# Patient Record
Sex: MALE | Race: OTHER | HISPANIC OR LATINO | Employment: FULL TIME | URBAN - METROPOLITAN AREA
[De-identification: names, ages, dates, MRNs, and addresses within clinical notes are randomized per-mention and may not be internally consistent; named-entity substitution may affect disease eponyms.]

---

## 2023-03-23 ENCOUNTER — OFFICE VISIT (OUTPATIENT)
Dept: BARIATRICS | Facility: CLINIC | Age: 28
End: 2023-03-23

## 2023-03-23 VITALS
DIASTOLIC BLOOD PRESSURE: 70 MMHG | WEIGHT: 280.2 LBS | BODY MASS INDEX: 39.23 KG/M2 | HEIGHT: 71 IN | SYSTOLIC BLOOD PRESSURE: 118 MMHG | HEART RATE: 81 BPM

## 2023-03-23 DIAGNOSIS — R63.5 ABNORMAL WEIGHT GAIN: ICD-10-CM

## 2023-03-23 DIAGNOSIS — R73.03 PREDIABETES: ICD-10-CM

## 2023-03-23 DIAGNOSIS — Z91.89 AT RISK FOR SLEEP APNEA: ICD-10-CM

## 2023-03-23 DIAGNOSIS — E66.9 OBESITY, CLASS II, BMI 35-39.9: Primary | ICD-10-CM

## 2023-03-23 RX ORDER — FAMOTIDINE 40 MG/1
TABLET, FILM COATED ORAL
COMMUNITY
Start: 2023-01-10

## 2023-03-23 RX ORDER — TERBINAFINE HYDROCHLORIDE 250 MG/1
250 TABLET ORAL DAILY
COMMUNITY
Start: 2023-03-16

## 2023-03-23 NOTE — ASSESSMENT & PLAN NOTE
- Stop bang 5/8  - Risks of untreated sleep apnea reviewed, including increased risk for myocardial infarction and stroke, increased difficulty with weight loss, and risk of sudden cardiac death due to arrhythmia   - Sleep medicine referral placed

## 2023-03-23 NOTE — ASSESSMENT & PLAN NOTE
- Discussed options of HealthyCORE-Intensive Lifestyle Intervention Program, Very Low Calorie Diet-VLCD and Conservative Program and the role of weight loss medications  - Explained the importance of making lifestyle changes first before starting anti-obesity medications  - Patient is interested in pursuing Conservative Program  - Initial weight loss goal of 5-10% weight loss for improved health  - Weight loss can improve patient's co-morbid conditions and/or prevent weight-related complications  - Stop Rogesusan Thompsonen 5/8  - Labs reviewed: CMP 1/18/2023, CMP, lipid panel, CBC, and A1C 12/13/2022  ALT elevated - being monitored by PCP  Chol, trig, and LDL elevated, HDL low, A1C in prediabetes range at 5 7, which will all likely improve with weight loss and cardiovascular exercise  Otherwise, labs within acceptable range  - check TSH  Goals:  Do not skip meals  Food log (ie ) www myfitnesspal com,sparkpeople  com,loseit com,calorieking  com,etc  baritastic (use skinnytaste  com, dietdoctor  com or smartphone ponce [employer] for recipes)  No sugary beverages  At least 64oz of water daily  Increase physical activity by 10 minutes daily  Gradually increase physical activity to a goal of 5 days per week for 30 minutes of MODERATE intensity PLUS 2 days per week of FULL BODY resistance training (use smartphone apps [employer], Home Workout, etc )  Start food logging, weighing and measuring food, including creamer and sugar  2200 calories per day  Sample menu given  Increase water to at least 64 oz daily  Start exercise - walking or gym 2 days per week for 10 minutes and gradually increase to goal of 5 days per week for 30 minutes cardiovascular exercise and 2 days resistance training

## 2023-03-23 NOTE — PROGRESS NOTES
Assessment/Plan:    Obesity, Class II, BMI 35-39 9  - Discussed options of HealthyCORE-Intensive Lifestyle Intervention Program, Very Low Calorie Diet-VLCD and Conservative Program and the role of weight loss medications  - Explained the importance of making lifestyle changes first before starting anti-obesity medications  - Patient is interested in pursuing Conservative Program  - Initial weight loss goal of 5-10% weight loss for improved health  - Weight loss can improve patient's co-morbid conditions and/or prevent weight-related complications  - Stop Rosa Fritz 5/8  - Labs reviewed: CMP 1/18/2023, CMP, lipid panel, CBC, and A1C 12/13/2022  ALT elevated - being monitored by PCP  Chol, trig, and LDL elevated, HDL low, A1C in prediabetes range at 5 7, which will all likely improve with weight loss and cardiovascular exercise  Otherwise, labs within acceptable range  - check TSH  Goals:  Do not skip meals  Food log (ie ) www myfitnesspal com,sparkpeople  com,loseit com,calorieking  com,etc  baritastic (use skinnytaste  com, dietdoctor  com or smartphone ponce [employer] for recipes)  No sugary beverages  At least 64oz of water daily  Increase physical activity by 10 minutes daily  Gradually increase physical activity to a goal of 5 days per week for 30 minutes of MODERATE intensity PLUS 2 days per week of FULL BODY resistance training (use smartphone apps [employer], Home Workout, etc )  Start food logging, weighing and measuring food, including creamer and sugar  2200 calories per day  Sample menu given  Increase water to at least 64 oz daily  Start exercise - walking or gym 2 days per week for 10 minutes and gradually increase to goal of 5 days per week for 30 minutes cardiovascular exercise and 2 days resistance training  Prediabetes  - Will likely improve with weight loss  At risk for sleep apnea  - Stop bang 5/8     - Risks of untreated sleep apnea reviewed, including increased risk for myocardial infarction and stroke, increased difficulty with weight loss, and risk of sudden cardiac death due to arrhythmia   - Sleep medicine referral placed  Satnam Colon was seen today for consult  Diagnoses and all orders for this visit:    Obesity, Class II, BMI 35-39 9  -     TSH, 3rd generation with Free T4 reflex; Future    Prediabetes  -     TSH, 3rd generation with Free T4 reflex; Future    At risk for sleep apnea  -     Ambulatory referral to Sleep Medicine; Future    Abnormal weight gain  -     TSH, 3rd generation with Free T4 reflex; Future          Follow up in approximately 2-3 months with Non-Surgical Physician/Advanced Practitioner  Subjective:   Chief Complaint   Patient presents with   • Consult     MWM Consult       Patient ID: Omar David  is a 29 y o  male with excess weight/obesity here to pursue weight management  Previous notes and records have been reviewed  History reviewed  No pertinent past medical history  History reviewed  No pertinent surgical history  HPI:  Wt Readings from Last 20 Encounters:   23 127 kg (280 lb 3 2 oz)     Obesity/Excess Weight:  Severity: Moderate  Onset:  5 years    Modifiers: Diet and Exercise, Physician Supervised Weight Loss Program and Prescription Weight Loss Medications  Contributing factors: Poor Food Choices and Insufficient time to make appropriate lifestyle changes  Associated symptoms: increased joint pain and increased shortness of breath    Took phentermine and topamax in the past, lost weight, but gained back when stopped  Had some blurred vision and didn't feel well at first, but side effects improved         Hydration: 3-4 bottles of water, 2 cups coffee with cream and sugar, soda once a week  Alcohol: rare  Smoking: vapes almost daily  Exercise: nothing currently   Occupation: [employer]  Sleep: 5-6 hours  STOP ban/8    Highest weight: current  Current weight: 280 1 lbs BMI 39 08  Goal weight: 225-230 lbs    Colonoscopy: N/A    The following portions of the patient's history were reviewed and updated as appropriate: allergies, current medications, past family history, past medical history, past social history, past surgical history, and problem list     Family History   Problem Relation Age of Onset   • Dementia Father    • Thyroid disease Maternal Grandmother    • Cancer Neg Hx    • Diabetes Neg Hx    • Heart disease Neg Hx    • Stroke Neg Hx         Review of Systems   HENT: Negative for sore throat  Respiratory: Negative for cough and shortness of breath  Cardiovascular: Negative for chest pain and palpitations  Gastrointestinal: Negative for constipation, diarrhea, nausea and vomiting         + GERD controlled with PRN medication   Endocrine: Positive for heat intolerance (intermittent)  Negative for cold intolerance  Genitourinary: Negative for dysuria  Musculoskeletal: Positive for arthralgias  Negative for back pain  Skin: Negative for rash  Neurological: Negative for headaches  Psychiatric/Behavioral: Negative for suicidal ideas (or HI)  Denies depression and anxiety       Objective:  /70   Pulse 81   Ht 5' 11" (1 803 m)   Wt 127 kg (280 lb 3 2 oz)   BMI 39 08 kg/m²     Physical Exam  Vitals and nursing note reviewed  Constitutional   General appearance: Abnormal   well developed and obese  Eyes No conjunctival injection  Ears, Nose, Mouth, and Throat Oral mucosa moist    Pulmonary   Respiratory effort: No increased work of breathing or signs of respiratory distress  Cardiovascular     Examination of extremities for edema and/or varicosities: Normal   no edema  Abdomen   Abdomen: Abnormal   The abdomen was obese      Musculoskeletal   Normal range of motion  Neurological   Gait and station: Normal     Psychiatric   Orientation to person, place and time: Normal     Affect: appropriate

## 2023-06-13 ENCOUNTER — OFFICE VISIT (OUTPATIENT)
Dept: BARIATRICS | Facility: CLINIC | Age: 28
End: 2023-06-13
Payer: COMMERCIAL

## 2023-06-13 VITALS
RESPIRATION RATE: 16 BRPM | WEIGHT: 286 LBS | DIASTOLIC BLOOD PRESSURE: 80 MMHG | SYSTOLIC BLOOD PRESSURE: 124 MMHG | HEART RATE: 76 BPM | HEIGHT: 71 IN | BODY MASS INDEX: 40.04 KG/M2

## 2023-06-13 DIAGNOSIS — K21.9 GERD (GASTROESOPHAGEAL REFLUX DISEASE): ICD-10-CM

## 2023-06-13 DIAGNOSIS — Z91.89 AT RISK FOR SLEEP APNEA: ICD-10-CM

## 2023-06-13 DIAGNOSIS — E66.9 OBESITY, CLASS II, BMI 35-39.9: Primary | ICD-10-CM

## 2023-06-13 DIAGNOSIS — R73.03 PREDIABETES: ICD-10-CM

## 2023-06-13 PROCEDURE — 99214 OFFICE O/P EST MOD 30 MIN: CPT | Performed by: NURSE PRACTITIONER

## 2023-06-13 RX ORDER — CALCIUM CARBONATE 500 MG/1
2 TABLET, CHEWABLE ORAL DAILY PRN
COMMUNITY

## 2023-06-13 NOTE — PROGRESS NOTES
Assessment/Plan:     Obesity, Class II, BMI 35-39 9  - Patient is pursuing Conservative Program  - Initial weight loss goal of 5-10% weight loss for improved health  - Took phentermine and Topamax in the past, lost weight, but had side effects and gained weight back after stopping medication    - Struggling with appetite  - Discussed trying another medication versus weight loss surgery and he would like to pursue weight loss surgery  - Advised to schedule sleep medicine evaluation - stop bang 5/8  - Get TSH drawn  Initial: 280 1 lbs BMI 39 08  Current: 286 lbs BMI 39 89  Change: +5 9 lbs  Goal: 225-230 lbs    Goals:  Great job eliminating soda  Keep up the great work with water intake, at least 64 oz daily  Gradually increase exercise to goal of 5 days per week for 30 minutes  View online weight loss surgery seminar  Schedule 3 hour surgical evaluation  Review comparison of bariatric surgical procedures handout  Reviewed 30/60 rule  No alcohol or NSAIDS following surgery  Reviewed that he will need to quit vaping to qualify for weight loss surgery and he feels that he can do this  Prediabetes  - Will likely improve with weight loss  At risk for sleep apnea  - Stop bang 5/8  - Risks of untreated sleep apnea reviewed, including increased risk for myocardial infarction and stroke, increased difficulty with weight loss, and risk of sudden cardiac death due to arrhythmia   - Sleep medicine referral previously placed  Advised to contact them and schedule  Contact information given  GERD (gastroesophageal reflux disease)  - Taking Pepcid and Tums as needed  May improve with weight loss and lifestyle modification  Continue management with prescribing provider  [facility] was [facility] for follow-up      Diagnoses and all orders for this visit:    Obesity, Class II, BMI 35-39 9    Prediabetes    At risk for sleep apnea    GERD (gastroesophageal reflux disease)          Follow up in approximately 1 month with Surgical Dietician, Surgical  and Surgical   Subjective:   Chief Complaint   Patient presents with   • Follow-up     MWM 3mth f/u; waist 50in       Patient ID: Eileen Adjutant  is a 29 y o  male with excess weight/obesity here to pursue weight management  Patient is pursuing Conservative Program    Most recent notes and records were reviewed  HPI    Wt Readings from Last 10 Encounters:   06/13/23 130 kg (286 lb)   03/23/23 127 kg (280 lb 3 2 oz)       Took phentermine and topamax in the past, lost weight, but gained back when stopped  Had some blurred vision and didn't feel well at first, but side effects improved  Tried food logging, but became difficult and stopped  Feels hungry often, more so at night      B- skips  S- none  L- rice and chicken and salad   S- none  D- green bananas with cheese and eggs   S- none    Hydration: 4 bottles of water, 2 cups coffee with cream and sugar  Alcohol: rare  Smoking: vapes nicotine almost daily  Exercise: gym twice a week 45 minutes - cardio and weights    Occupation: [employer]  Sleep: 5-6 hours     Colonoscopy: N/A        The following portions of the patient's history were reviewed and updated as appropriate: allergies, current medications, past family history, past medical history, past social history, past surgical history, and problem list     Family History   Problem Relation Age of Onset   • Dementia Father    • Thyroid disease Maternal Grandmother    • Cancer Neg Hx    • Diabetes Neg Hx    • Heart disease Neg Hx    • Stroke Neg Hx         Review of Systems   HENT: Negative for sore throat  Respiratory: Negative for cough and shortness of breath  Cardiovascular: Negative for chest pain and palpitations     Gastrointestinal: Negative for abdominal pain, constipation, diarrhea, nausea and vomiting         + GERD following with PCP, controlled with PRN Tums   Musculoskeletal: Positive for "arthralgias (left ankle, advised to see PCP if not improvement) and back pain (chronic)  Skin: Negative for rash  Psychiatric/Behavioral: Negative for suicidal ideas  Denies anxiety and depression       Objective:  /80   Pulse 76   Resp 16   Ht 5' 11\" (1 803 m)   Wt 130 kg (286 lb)   BMI 39 89 kg/m²     Physical Exam  Vitals and nursing note reviewed  Constitutional   General appearance: Abnormal   well developed and obese  Eyes No conjunctival injection  Ears, Nose, Mouth, and Throat Oral mucosa moist    Pulmonary   Respiratory effort: No increased work of breathing or signs of respiratory distress  Cardiovascular     Examination of extremities for edema and/or varicosities: Normal   no edema  Abdomen   Abdomen: Abnormal   The abdomen was obese      Musculoskeletal   Normal range of motion  Neurological   Gait and station: Normal     Psychiatric   Orientation to person, place and time: Normal     Affect: appropriate      "

## 2023-06-13 NOTE — ASSESSMENT & PLAN NOTE
- Taking Pepcid and Tums as needed  May improve with weight loss and lifestyle modification  Continue management with prescribing provider

## 2023-06-13 NOTE — ASSESSMENT & PLAN NOTE
- Stop bang 5/8  - Risks of untreated sleep apnea reviewed, including increased risk for myocardial infarction and stroke, increased difficulty with weight loss, and risk of sudden cardiac death due to arrhythmia   - Sleep medicine referral previously placed  Advised to contact them and schedule  Contact information given

## 2023-06-13 NOTE — ASSESSMENT & PLAN NOTE
- Patient is pursuing Conservative Program  - Initial weight loss goal of 5-10% weight loss for improved health  - Took phentermine and Topamax in the past, lost weight, but had side effects and gained weight back after stopping medication    - Struggling with appetite  - Discussed trying another medication versus weight loss surgery and he would like to pursue weight loss surgery  - Advised to schedule sleep medicine evaluation - stop bang 5/8  - Get TSH drawn  Initial: 280 1 lbs BMI 39 08  Current: 286 lbs BMI 39 89  Change: +5 9 lbs  Goal: 225-230 lbs    Goals:  Great job eliminating soda  Keep up the great work with water intake, at least 64 oz daily  Gradually increase exercise to goal of 5 days per week for 30 minutes  View online weight loss surgery seminar  Schedule 3 hour surgical evaluation  Review comparison of bariatric surgical procedures handout  Reviewed 30/60 rule  No alcohol or NSAIDS following surgery  Reviewed that he will need to quit vaping to qualify for weight loss surgery and he feels that he can do this

## 2023-07-18 ENCOUNTER — OFFICE VISIT (OUTPATIENT)
Dept: BARIATRICS | Facility: CLINIC | Age: 28
End: 2023-07-18

## 2023-07-18 VITALS — HEIGHT: 72 IN | WEIGHT: 290 LBS | BODY MASS INDEX: 39.28 KG/M2

## 2023-07-18 DIAGNOSIS — E66.9 OBESITY, CLASS II, BMI 35-39.9: Primary | ICD-10-CM

## 2023-07-18 PROCEDURE — RECHECK: Performed by: DIETITIAN, REGISTERED

## 2023-07-18 NOTE — PROGRESS NOTES
Height 6' (1.829 m), weight 132 kg (290 lb). Body mass index is 39.33 kg/m². Ashely Le currently does not qualify for surgery:  BMI 39.33 only comorbidity is GERD. Pt has sleep consult scheduled for 8/21/23.   I told him if he gets diagnosed with sleep apnea he can reschedule his eval.

## 2023-08-21 ENCOUNTER — OFFICE VISIT (OUTPATIENT)
Age: 28
End: 2023-08-21
Payer: COMMERCIAL

## 2023-08-21 VITALS
WEIGHT: 298.2 LBS | DIASTOLIC BLOOD PRESSURE: 80 MMHG | SYSTOLIC BLOOD PRESSURE: 138 MMHG | HEART RATE: 82 BPM | OXYGEN SATURATION: 97 % | BODY MASS INDEX: 40.39 KG/M2 | HEIGHT: 72 IN

## 2023-08-21 DIAGNOSIS — Z91.89 AT RISK FOR SLEEP APNEA: Primary | ICD-10-CM

## 2023-08-21 DIAGNOSIS — E66.9 OBESITY, CLASS II, BMI 35-39.9: ICD-10-CM

## 2023-08-21 PROCEDURE — 99203 OFFICE O/P NEW LOW 30 MIN: CPT | Performed by: INTERNAL MEDICINE

## 2023-08-21 RX ORDER — OMEPRAZOLE 20 MG/1
20 CAPSULE, DELAYED RELEASE ORAL DAILY
COMMUNITY
Start: 2023-07-25

## 2023-08-21 NOTE — PATIENT INSTRUCTIONS
It is very important to avoid driving while drowsy, this can be very dangerous or even cause serious injury or death. If sleepy, it is not safe to get behind the wheel. If you are driving and feels sleepy, it is very important to pull over right away. Even losing control of the car for a split second can be deadly. If you feel you cannot control when sleepiness occurs and cannot prevented, it is important to not drive at all until this improves. Please let me know if you experience this as it is very important. Nursing Support:  When: Monday through Friday 7A-5PM except holidays  Where: Our direct line is [phone]. If you are having a true emergency please call 911. In the event that the line is busy or it is after hours please leave a voice message and we will return your call. Please speak clearly, leaving your full name, birth date, best number to reach you and the reason for your call. Medication refills: We will need the name of the medication, the dosage, the ordering provider, whether you get a 30 or 90 day refill, and the pharmacy name and address. Medications will be ordered by the provider only. Nurses cannot call in prescriptions. Please allow 7 days for medication refills. Physician requested updates: If your provider requested that you call with an update after starting medication, please be ready to provide us the medication and dosage, what time you take your medication, the time you attempt to fall asleep, time you fall asleep, when you wake up, and what time you get out of bed. Sleep Study Results: We will contact you with sleep study results and/or next steps after the physician has reviewed your testing.

## 2023-08-21 NOTE — PROGRESS NOTES
Sleep Consultation   Laura Brown 29 y.o. male MRN: [id number]      Reason for consultation: Evaluation for LETA     Requesting physician: Dr. Rui Francisco    Assessment/Plan  Patient is a 31yo M with PMH including GERD, prediabetes who presents for suspected obstructive sleep apnea, was referred by bariatric surgery. 1. Suspected sleep apnea  Mallampati class IV, Body mass index is 40.44 kg/m²., Neck Circumference: 18.5  He is at risk for obstructive sleep apnea based on STOP BANG survey 5/8  S/s: Patient complains of heavy snoring, excessive daytime fatigue, wakes up choking at times, has recent weight gain of 45lb in the last year   Leslie score:13/24  I discussed in depth the diagnostic studies and treatment options involved with obstructive sleep apnea  I also discussed in depth the risk of leaving sleep apnea untreated including hypertension, heart failure, arrhythmia, MI and stroke. The patient is agreeable to undergo testing and treatment of obstructive sleep apnea. At this time, will order Home Sleep Testing. He understands the pitfalls he/she may encounter along the way and is willing to attempt CPAP treatment. He will follow up 31-90 days after HST is completed for compliance check. 1. At risk for sleep apnea  - Home Study; Future    2. Weight gain  - Home Study; Future    3. Snores  - Home Study; Future      History of Present Illness   HPI:  Laura Brown is a 29 y.o. male with PMHx including GERD, prediabetes. Patient presents for evaluation of obstructive sleep apnea. Gained 45 lbs in the last year. He is interested in bariatric surgery but states he currently does not quality with comorbid diagnoses. Was told he snores heavily, wakes up choking at times. He complains of excessive daytime fatigue as well. Snoring is ongoing for 7 years.      Work history: [employer]  Work hours: daytime hours  Bed Time: Goes to bed between 12-1AM   Sleep Onset Latency: 30 minutes   Frequency of Night-time Awakenings: 2-3 times feels like he is choking   Fall asleep after awakenings: yes quickly   [place] Time: 8:15AM  Days off schedule: same schedule   Naps: rarely   Sleep medications: Takes melatonin intermittently but is not effective   Caffeine use: 12oz  Alcohol use: denies  Cigarettes: denies, formerly vaped - quit 2 weeks ago     Sleep Disordered Breathing:  Snoring: yes  Witnessed apneas: denies   Shortness of breath or choking/gasping for air: +yes  Morning dry mouth: +yes  Morning headaches: denies  Non-refreshing sleep: +yes  Nasal congestion: denies  Nocturia: denies     Other sleep related behaviors and symptoms:   Restless leg symptoms: denies  Kicking legs during sleep: denies  Parasomnias: denies  Nightmares: denies  Acid reflux during sleep: +yes  Teeth grinding: denies  Sleep paralysis, cataplexy, sleep attacks or hypnagogic or hypnopompic hallucinations: denies  REM Behavioral Sleep Disorder: denies    Daytime Symptoms:   Excessive daytime sleepiness: +yes  Driving while drowsy:  denies  History of motor vehicle accidents or near misses: denies  Mood problems: denies  Problems at work, school or at home: denies    [place] Score: 14/24  Sitting and reading: Moderate chance of dozing  Watching TV: High chance of dozing  Sitting, inactive in a public place (e.g. a theatre or a meeting): Slight chance of dozing  As a passenger in a car for an hour without a break: Moderate chance of dozing  Lying down to rest in the afternoon when circumstances permit: Moderate chance of dozing  Sitting and talking to someone: Slight chance of dozing  Sitting quietly after a lunch without alcohol: High chance of dozing  In a car, while stopped for a few minutes in traffic: Would never doze  Total score: 14    History of tonsillectomy: denies    Historical Information   History reviewed. No pertinent past medical history. History reviewed. No pertinent surgical history.   Family History   Problem Relation Age of Onset   • Dementia Father    • Thyroid disease Maternal Grandmother    • Cancer Neg Hx    • Diabetes Neg Hx    • Heart disease Neg Hx    • Stroke Neg Hx      Social History     Socioeconomic History   • Marital status: Single     Spouse name: Not on file   • Number of children: Not on file   • Years of education: Not on file   • Highest education level: Not on file   Occupational History   • Not on file   Tobacco Use   • Smoking status: Never   • Smokeless tobacco: Never   • Tobacco comments:     Was Vaping, quit 2 weeks ago    Vaping Use   • Vaping Use: Every day   • Substances: Nicotine   Substance and Sexual Activity   • Alcohol use: Never   • Drug use: Never   • Sexual activity: Not on file   Other Topics Concern   • Not on file   Social History Narrative    Drinks 2 cups of coffee a day     Social Determinants of Health     Financial Resource Strain: Not on file   Food Insecurity: Not on file   Transportation Needs: Not on file   Physical Activity: Not on file   Stress: Not on file   Social Connections: Not on file   Intimate Partner Violence: Not on file   Housing Stability: Not on file       Meds/Allergies   No Known Allergies    Home medications:  Prior to Admission medications    Medication Sig Start Date End Date Taking? Authorizing Provider   calcium carbonate (TUMS) 500 mg chewable tablet Chew 2 tablets daily as needed for indigestion or heartburn PRN   Yes Historical Provider, MD   omeprazole (PriLOSEC) 20 mg delayed release capsule Take 20 mg by mouth daily PRN 7/25/23  Yes Historical Provider, MD   famotidine (PEPCID) 40 MG tablet Takes as needed. Patient not taking: Reported on 8/21/2023 1/10/23   Historical Provider, MD       Vitals:   Blood pressure 138/80, pulse 82, height 6' (1.829 m), weight 135 kg (298 lb 3.2 oz), SpO2 97 %. , Body mass index is 40.44 kg/m². Neck Circumference: 18.5    Physical Exam:  General: Sitting in chair, awake alert and oriented to person, place, and time.  No acute distress  HEENT: PERRL, Nares patent, no craniofacial abnormalities. Mucous membranes, moist, no oral lesions, and normal dentition. Mallampati class IV, tonsils 1+, mild overbite   NECK: Neck circumference 18.5inches , Trachea midline, no accessory muscle use, and no stridor   CARDIAC: Regular rate and rhythm  PULM: CTA bilaterally no wheezing, rhonchi or rales. No conversational dyspnea  EXT: No cyanosis, no clubbing, and no peripheral edema    NEURO: No focal neurologic deficits, moving all extremities appropriately    Labs: I have personally reviewed pertinent lab results. , CBC: No results found for: "WBC", "HGB", "HCT", "MCV", "PLT", "ADJUSTEDWBC", "RBC", "MCH", "MCHC", "RDW", "MPV", "NRBC", CMP: No results found for: "SODIUM", "K", "CL", "CO2", "ANIONGAP", "BUN", "CREATININE", "GLUCOSE", "CALCIUM", "AST", "ALT", "ALKPHOS", "PROT", "BILITOT", "EGFR"  No results found for: "WBC", "HGB", "HCT", "MCV", "PLT"   No results found for: "GLUCOSE", "CALCIUM", "NA", "K", "CO2", "CL", "BUN", "CREATININE"  No results found for: "IRON", "TIBC", "FERRITIN"  No results found for: "PBOOBWVT19"  No results found for: "FOLATE"      Sleep studies: [address]   11:18 AM   Ermelinda Cooks Sleep Fellow

## 2023-08-25 ENCOUNTER — TELEPHONE (OUTPATIENT)
Dept: ADMINISTRATIVE | Facility: HOSPITAL | Age: 28
End: 2023-08-25

## 2023-08-25 DIAGNOSIS — R06.83 SNORING: Primary | ICD-10-CM

## 2023-08-25 DIAGNOSIS — R63.5 WEIGHT GAIN: ICD-10-CM

## 2023-08-25 DIAGNOSIS — G47.9 SLEEP DISORDER: ICD-10-CM

## 2023-08-25 NOTE — TELEPHONE ENCOUNTER
Garret Wang! An order for a home study was placed for patient however he is covered under [email or web address] of which is a state insurance. Unfortunately, state insurance does not cover this study. Patient would need an in facility sleep study. If you think an in facility sleep study would benefit pt, please place order and I will give patient a call to get that scheduled. Thanks!    Erica Collins

## 2023-08-30 ENCOUNTER — TELEPHONE (OUTPATIENT)
Dept: SLEEP CENTER | Facility: CLINIC | Age: 28
End: 2023-08-30

## 2023-08-30 ENCOUNTER — HOSPITAL ENCOUNTER (OUTPATIENT)
Dept: SLEEP CENTER | Facility: CLINIC | Age: 28
Discharge: HOME | End: 2023-08-30
Payer: COMMERCIAL

## 2023-08-30 DIAGNOSIS — R06.83 SNORING: ICD-10-CM

## 2023-08-30 DIAGNOSIS — R63.5 WEIGHT GAIN: ICD-10-CM

## 2023-08-30 DIAGNOSIS — G47.9 SLEEP DISORDER: ICD-10-CM

## 2023-08-30 PROCEDURE — 95810 POLYSOM 6/> YRS 4/> PARAM: CPT

## 2023-08-30 PROCEDURE — 95810 POLYSOM 6/> YRS 4/> PARAM: CPT | Performed by: INTERNAL MEDICINE

## 2023-08-30 NOTE — TELEPHONE ENCOUNTER
----- Message from Shonda Casanova MD sent at 8/30/2023  8:39 AM EDT -----  Approved    ----- Message -----  From: Selina Lilly  Sent: 8/29/2023   8:32 AM EDT  To: Sleep Medicine [facility] Provider    This Diagnostic sleep study needs approval.     If approved please sign and return to clerical pool. If denied please include reasons why. Also provide alternative testing if warranted. Please sign and return to clerical pool.

## 2023-08-31 NOTE — PROGRESS NOTES
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  Caffeine use:NO    Alcohol:Dayshift workers after 5PM: Alcohol use:NO    Typical day for patient:YES       Study Documentation    Sleep Study Indications: Snoring, EDS, and BMI>30. Sleep Study: Diagnostic   Snore:Severe  Supplemental O2: no    Minimum SaO2 89%  Baseline SaO2 94%      EKG abnormalities: no     EEG abnormalities: no    Sleep Study Recorded < 2 hours: N/A    Sleep Study Recorded > 2 hours but incomplete study: N/A    Sleep Study Recorded 6 hours but no sleep obtained: NO    Patient classification: employed       Post-Sleep Study    Medication used at bedtime or during sleep study:NO    Patient reports time it took to fall asleep:30 to 60 minutes    Patient reports waking up during study:3 or more times. Patient reports returning to sleep without difficulty. Patient reports sleeping 4 to 6 hours without dreaming. Patient reports sleep during study:worse than usual    Patient rated sleepiness: Very sleepy or tired    PAP treatment:no.

## 2023-09-01 DIAGNOSIS — G47.33 OSA (OBSTRUCTIVE SLEEP APNEA): Primary | ICD-10-CM

## 2023-09-06 ENCOUNTER — TELEPHONE (OUTPATIENT)
Dept: SLEEP CENTER | Facility: CLINIC | Age: 28
End: 2023-09-06

## 2023-09-06 LAB

## 2023-09-06 NOTE — TELEPHONE ENCOUNTER
Called patient and advised sleep study resulted and shows mild LETA. APAP ordered. Scheduled DME set up 9/25/23 in the [facility] office. Rx for CPAP and clinical information sent to Marcio Flores via [place]. Scheduled compliance follow up 12/11/23.

## 2023-09-08 NOTE — ASSESSMENT & PLAN NOTE
Bariatric surgery evaluation. BMI 40.44. No under evaluation for sleep apnea.   If undergoing bariatric surgery will need treatment of sleep apnea to reduce risk of perioperative pulmonary complications
High risk for LETA. STOP-BANG 5. Neck circumference 18.5 inches. Heavy snoring, EDS, choking during sleep. This is associated with 45 pound weight gain. Home sleep study ordered  He will be called 1 to 2 weeks after sleep study is resulted for next steps  If diagnosed with LETA of any degree he is amenable for trial on CPAP. Follow-up for CPAP compliance after initiating CPAP.
Speaking Coherently/Age appropriate

## 2023-09-21 LAB
DME PARACHUTE DELIVERY DATE EXPECTED: NORMAL
DME PARACHUTE DELIVERY DATE REQUESTED: NORMAL
DME PARACHUTE DELIVERY NOTE: NORMAL
DME PARACHUTE ITEM DESCRIPTION: NORMAL
DME PARACHUTE ORDER STATUS: NORMAL
DME PARACHUTE SUPPLIER NAME: NORMAL
DME PARACHUTE SUPPLIER PHONE: NORMAL

## 2023-09-25 ENCOUNTER — TELEPHONE (OUTPATIENT)
Dept: SLEEP CENTER | Facility: CLINIC | Age: 28
End: 2023-09-25

## 2023-09-25 NOTE — TELEPHONE ENCOUNTER
I set this pt. up in [facility]- on a ResMed S11 5-15cm and gave the P10 nasal pillow (S) mask.

## 2023-09-26 LAB
